# Patient Record
Sex: MALE | Race: WHITE
[De-identification: names, ages, dates, MRNs, and addresses within clinical notes are randomized per-mention and may not be internally consistent; named-entity substitution may affect disease eponyms.]

---

## 2020-09-27 ENCOUNTER — HOSPITAL ENCOUNTER (EMERGENCY)
Dept: HOSPITAL 46 - ED | Age: 40
Discharge: HOME | End: 2020-09-27
Payer: COMMERCIAL

## 2020-09-27 VITALS — WEIGHT: 195 LBS | BODY MASS INDEX: 27.92 KG/M2 | HEIGHT: 70 IN

## 2020-09-27 DIAGNOSIS — F17.200: ICD-10-CM

## 2020-09-27 DIAGNOSIS — N43.3: ICD-10-CM

## 2020-09-27 DIAGNOSIS — N45.1: Primary | ICD-10-CM

## 2020-09-27 NOTE — XMS
PreManage Notification: LANDY SUNSHINE MRN:[id number]
 
Security Information
 
Security Events
No recent Security Events currently on file
 
 
 
CRITERIA MET
------------
- [facility] - 2 Visits in 30 Days
 
 
CARE PROVIDERS
-------------------------------------------------------------------------------------
SHELIA VALENCIA     Nurse Practitioner     Current
 
PHONE: [phone]
-------------------------------------------------------------------------------------
 
Andra has no Care Guidelines for this patient.
 
EKAMRYN VISIT COUNT (12 MO.)
-------------------------------------------------------------------------------------
2 [facility]
 
[address] [place] [address]
-------------------------------------------------------------------------------------
TOTAL 3
-------------------------------------------------------------------------------------
NOTE: Visits indicate total known visits.
 
ED/UCC VISIT TRACKING (12 MO.)
-------------------------------------------------------------------------------------
09/27/2020 08:28
[facility] [place]
 
TYPE: Emergency
 
COMPLAINT:
- TESTICULAR PAIN
-------------------------------------------------------------------------------------
09/14/2020 13:00
[facility] [place]
 
TYPE: Emergency
 
DIAGNOSES:
- Laceration without foreign body, right foot, initial encounte
- Foot Injury
- Contusion of right foot, initial encounter
- Right foot injury
-------------------------------------------------------------------------------------
08/12/2020 08:35
[facility] [place]
 
TYPE: Urgent Care
 
 
DIAGNOSES:
- Other stimulant abuse, in remission
- Strain of muscle, fascia and tendon of lower back, initial en
- Encounter for screening for depression
- Alcohol abuse counseling and surveillance of alcoholic
- Nicotine dependence, unspecified, uncomplicated
- Encounter for other general counseling and advice on contrace
-------------------------------------------------------------------------------------
01/07/2020 14:28
[facility] [place]
 
TYPE: Emergency
 
DIAGNOSES:
- Back pain
- Radiculopathy, lumbar region
-------------------------------------------------------------------------------------
 
 
INPATIENT VISIT TRACKING (12 MO.)
No inpatient visits to display in this time frame
 
[email or web address]

## 2022-12-15 ENCOUNTER — HOSPITAL ENCOUNTER (EMERGENCY)
Dept: HOSPITAL 46 - ED | Age: 42
Discharge: LEFT BEFORE DISCHARGE | End: 2022-12-15
Payer: COMMERCIAL

## 2022-12-15 VITALS — BODY MASS INDEX: 27.92 KG/M2 | HEIGHT: 70 IN | WEIGHT: 195 LBS

## 2022-12-15 DIAGNOSIS — Z53.21: Primary | ICD-10-CM

## 2022-12-15 NOTE — XMS
PreManage Notification: LANDY SUNSHINE MRN:[id number]
 
Security Information
 
Security Events
No recent Security Events currently on file
 
 
 
CRITERIA MET
------------
- [facility] - 2 Visits in 30 Days
 
 
CARE PROVIDERS
-------------------------------------------------------------------------------------
SHELIA VALENCIA     Nurse Practitioner     Current
 
PHONE: [phone]
-------------------------------------------------------------------------------------
 
Andra has no Care Guidelines for this patient.
 
E.RADHA VISIT COUNT (12 MO.)
-------------------------------------------------------------------------------------
2 [address]
-------------------------------------------------------------------------------------
TOTAL 3
-------------------------------------------------------------------------------------
NOTE: Visits indicate total known visits.
 
ED/C VISIT TRACKING (12 MO.)
-------------------------------------------------------------------------------------
12/15/2022 15:32
[facility] [place]
 
TYPE: Emergency
 
COMPLAINT:
- CHEST PAIN
-------------------------------------------------------------------------------------
11/27/2022 21:35
[facility]     [place]
 
TYPE: Emergency
 
DIAGNOSES:
- Adverse effect of fentanyl or fentanyl analogs, initial encounter
- poss heart attack
-------------------------------------------------------------------------------------
11/27/2022 09:04
[facility] [place]
 
TYPE: Emergency
 
DIAGNOSES:
- Poisoning by unspecified drugs, medicaments and biological substances, accidental
 
(unintentional), initial encounter
- Sleep apnea, unspecified
- OVERDOSE
-------------------------------------------------------------------------------------
 
 
INPATIENT VISIT TRACKING (12 MO.)
No inpatient visits to display in this time frame
 
[email or web address]

## 2022-12-17 NOTE — EKG
[facility]
                                    [address]
                                  [place]  [address]
_________________________________________________________________________________________
                                                                 Signed   
 
 
Normal sinus rhythm
Normal ECG
No previous ECGs available
Confirmed by EVELIO CASTRO MD ([id number]) on 12/17/2022 7:01:34 PM
 
 
 
 
 
 
 
 
 
 
 
 
 
 
 
 
 
 
 
 
 
 
 
 
 
 
 
 
 
 
 
 
 
 
 
 
 
 
 
 
 
    Electronically Signed By: EVELIO CASTRO MD  12/17/22 1901
_________________________________________________________________________________________
PATIENT NAME:     PAMELA SUNSHINEJEF CARMICHAEL JR            
MEDICAL RECORD #: [id number]                     Electrocardiogram             
          ACCT #: [id number]  
DATE OF BIRTH:   [birth year]                                       
PHYSICIAN:   EVELIO CASTRO MD                    REPORT #: [id number]
REPORT IS CONFIDENTIAL AND NOT TO BE RELEASED WITHOUT AUTHORIZATION